# Patient Record
Sex: FEMALE | NOT HISPANIC OR LATINO | Employment: FULL TIME | URBAN - METROPOLITAN AREA
[De-identification: names, ages, dates, MRNs, and addresses within clinical notes are randomized per-mention and may not be internally consistent; named-entity substitution may affect disease eponyms.]

---

## 2021-02-16 ENCOUNTER — OFFICE VISIT (OUTPATIENT)
Dept: URGENT CARE | Facility: MEDICAL CENTER | Age: 41
End: 2021-02-16
Payer: COMMERCIAL

## 2021-02-16 VITALS
BODY MASS INDEX: 30.04 KG/M2 | SYSTOLIC BLOOD PRESSURE: 142 MMHG | DIASTOLIC BLOOD PRESSURE: 83 MMHG | HEIGHT: 60 IN | WEIGHT: 153 LBS | RESPIRATION RATE: 18 BRPM | HEART RATE: 89 BPM | OXYGEN SATURATION: 100 % | TEMPERATURE: 98 F

## 2021-02-16 DIAGNOSIS — H20.9 IRITIS: Primary | ICD-10-CM

## 2021-02-16 PROCEDURE — G0382 LEV 3 HOSP TYPE B ED VISIT: HCPCS | Performed by: PHYSICIAN ASSISTANT

## 2021-02-16 PROCEDURE — 99203 OFFICE O/P NEW LOW 30 MIN: CPT | Performed by: PHYSICIAN ASSISTANT

## 2021-02-16 PROCEDURE — 99283 EMERGENCY DEPT VISIT LOW MDM: CPT | Performed by: PHYSICIAN ASSISTANT

## 2021-02-16 RX ORDER — OFLOXACIN 3 MG/ML
1 SOLUTION/ DROPS OPHTHALMIC 4 TIMES DAILY
Qty: 5 ML | Refills: 0 | Status: SHIPPED | OUTPATIENT
Start: 2021-02-16 | End: 2021-02-23

## 2021-02-16 RX ORDER — PREDNISONE 20 MG/1
40 TABLET ORAL DAILY
Qty: 10 TABLET | Refills: 0 | Status: SHIPPED | OUTPATIENT
Start: 2021-02-16 | End: 2021-02-21

## 2021-02-17 NOTE — PATIENT INSTRUCTIONS
Iritis  Ocuflox as directed  Prednisone 40 mg daily x 5 days  Go to ER if no improvement in 2 days or symptoms worsen at any time  Follow up with PCP in 3-5 days  Proceed to  ER if symptoms worsen  Iritis   WHAT YOU NEED TO KNOW:   Iritis is inflammation of your iris  The iris is the colored part of your eye  DISCHARGE INSTRUCTIONS:   Call your healthcare provider or ophthalmologist if:   · You have severe eye pain and a headache  · Your vision suddenly gets worse  · You have nausea or are vomiting  · Your pain gets worse, even after treatment  · You see halos or rainbows around lights  · You have questions or concerns about your condition or care  Medicines: You may need any of the following:  · Cycloplegic eyedrops  dilate your pupil and relax your eye muscles  This helps decrease pain and light sensitivity  · Steroid eyedrops  help decrease pain and inflammation  These are only used for a short time to relieve the inflammation  You may be given steroid medicine as pills if the cause of your iritis is not an infection  · Acetaminophen  decreases pain and fever  It is available without a doctor's order  Ask how much to take and how often to take it  Follow directions  Read the labels of all other medicines you are using to see if they also contain acetaminophen, or ask your doctor or pharmacist  Acetaminophen can cause liver damage if not taken correctly  Do not use more than 4 grams (4,000 milligrams) total of acetaminophen in one day  · Take your medicine as directed  Contact your healthcare provider if you think your medicine is not helping or if you have side effects  Tell him of her if you are allergic to any medicine  Keep a list of the medicines, vitamins, and herbs you take  Include the amounts, and when and why you take them  Bring the list or the pill bottles to follow-up visits  Carry your medicine list with you in case of an emergency      Manage iritis:   · Apply a warm compress to your eye  Wet a washcloth in warm water and wring it out  Place it gently over your eye for 20 minutes 3 to 4 times each day  This will help soothe your eye and decrease inflammation  · Wear dark sunglasses  This will help prevent pain and light sensitivity  Make sure the sunglasses have UVA and UVB protection  This will protect your eyes when you go outside  · Use eyedrops safely  If your treatment plan includes eyedrops, it is important to use them as directed  Your provider may give you detailed instructions to follow  The eyedrops may also come with safety instructions  Follow all instructions to help prevent an infection  Do not touch the tip of the bottle to your eye  Germs from your eye can spread to the medicine bottle  Follow up with your healthcare provider or ophthalmologist within 24 hours:  Write down your questions so you remember to ask them during your visits  © Copyright [address] Information is for End User's use only and may not be sold, redistributed or otherwise used for commercial purposes  All illustrations and images included in CareNotes® are the copyrighted property of [employer]  or [facility] Gino Collazo   The above information is an  only  It is not intended as medical advice for individual conditions or treatments  Talk to your doctor, nurse or pharmacist before following any medical regimen to see if it is safe and effective for you

## 2021-02-17 NOTE — PROGRESS NOTES
[facility]        NAME: Everette Rust is a 42 y o  female  : 1980    MRN: [id number]  DATE: 2021  TIME: 7:56 PM    Assessment and Plan   Iritis [H20 9]  1  Iritis  ofloxacin (OCUFLOX) 0 3 % ophthalmic solution    predniSONE 20 mg tablet         Patient Instructions     Iritis  Ocuflox as directed  Prednisone 40 mg daily x 5 days  Follow up with PCP in 3-5 days  Proceed to  ER if symptoms worsen  Chief Complaint     Chief Complaint   Patient presents with    Eye Problem     Pt's left eye is sensitive to light, especially in the morning and evening  Eye is red and painful without discharge  Pt denies visual disturbance  History of Present Illness       41 y/o female presents c/o eye redness and pain in the morning being controlled with tylenols x 3 days  States the pain gets better during the day but she does photophobia  Denies eye discharge, visual disturbances      Review of Systems   Review of Systems   Constitutional: Negative  HENT: Negative  Eyes: Positive for photophobia, pain and redness  Negative for discharge, itching and visual disturbance  Respiratory: Negative  Negative for cough, chest tightness, shortness of breath, wheezing and stridor  Cardiovascular: Negative  Negative for chest pain, palpitations and leg swelling           Current Medications       Current Outpatient Medications:     ofloxacin (OCUFLOX) 0 3 % ophthalmic solution, Administer 1 drop into the left eye 4 (four) times a day for 7 days, Disp: 5 mL, Rfl: 0    predniSONE 20 mg tablet, Take 2 tablets (40 mg total) by mouth daily for 5 days, Disp: 10 tablet, Rfl: 0    Current Allergies     Allergies as of 2021    (No Known Allergies)            The following portions of the patient's history were reviewed and updated as appropriate: allergies, current medications, past family history, past medical history, past social history, past surgical history and problem list      History reviewed  No pertinent past medical history  Past Surgical History:   Procedure Laterality Date    KIDNEY SURGERY         Family History   Problem Relation Age of Onset    Multiple myeloma Mother     No Known Problems Father          Medications have been verified  Objective   /83   Pulse 89   Temp 98 °F (36 7 °C) (Temporal)   Resp 18   Ht 4' 11 5" (1 511 m)   Wt 69 4 kg (153 lb)   LMP 02/02/2021 (Approximate)   SpO2 100%   BMI 30 39 kg/m²        Physical Exam     Physical Exam  Constitutional:       General: She is not in acute distress  Appearance: She is well-developed and normal weight  She is not diaphoretic  HENT:      Head: Normocephalic and atraumatic  Right Ear: Hearing, tympanic membrane, ear canal and external ear normal       Left Ear: Hearing, tympanic membrane, ear canal and external ear normal       Mouth/Throat:      Pharynx: Uvula midline  Eyes:      General: Lids are normal  Lids are everted, no foreign bodies appreciated  Vision grossly intact  Gaze aligned appropriately  Right eye: No foreign body, discharge or hordeolum  Left eye: No foreign body, discharge or hordeolum  Conjunctiva/sclera:      Right eye: Right conjunctiva is not injected  No chemosis, exudate or hemorrhage  Left eye: Left conjunctiva is injected  No chemosis, exudate or hemorrhage  Pupils: Pupils are equal, round, and reactive to light  Neck:      Musculoskeletal: Normal range of motion and neck supple  Cardiovascular:      Rate and Rhythm: Normal rate and regular rhythm  Heart sounds: Normal heart sounds  Pulmonary:      Effort: Pulmonary effort is normal       Breath sounds: Normal breath sounds  Lymphadenopathy:      Cervical: No cervical adenopathy  Neurological:      Mental Status: She is alert